# Patient Record
Sex: FEMALE | Race: BLACK OR AFRICAN AMERICAN | NOT HISPANIC OR LATINO
[De-identification: names, ages, dates, MRNs, and addresses within clinical notes are randomized per-mention and may not be internally consistent; named-entity substitution may affect disease eponyms.]

---

## 2017-09-19 ENCOUNTER — APPOINTMENT (OUTPATIENT)
Dept: OTOLARYNGOLOGY | Facility: CLINIC | Age: 42
End: 2017-09-19
Payer: COMMERCIAL

## 2017-09-19 VITALS — OXYGEN SATURATION: 100 % | DIASTOLIC BLOOD PRESSURE: 78 MMHG | SYSTOLIC BLOOD PRESSURE: 122 MMHG | HEART RATE: 61 BPM

## 2017-09-19 PROCEDURE — 31575 DIAGNOSTIC LARYNGOSCOPY: CPT

## 2017-09-19 PROCEDURE — 99214 OFFICE O/P EST MOD 30 MIN: CPT | Mod: 25

## 2017-09-19 RX ORDER — OMEPRAZOLE 40 MG/1
40 CAPSULE, DELAYED RELEASE ORAL TWICE DAILY
Qty: 120 | Refills: 2 | Status: ACTIVE | COMMUNITY
Start: 2017-09-19 | End: 1900-01-01

## 2017-09-22 LAB
25(OH)D3 SERPL-MCNC: 15.1 NG/ML
ALBUMIN SERPL ELPH-MCNC: 4.4 G/DL
ALP BLD-CCNC: 67 U/L
ALT SERPL-CCNC: 17 U/L
ANION GAP SERPL CALC-SCNC: 13 MMOL/L
AST SERPL-CCNC: 19 U/L
BASOPHILS # BLD AUTO: 0.01 K/UL
BASOPHILS NFR BLD AUTO: 0.2 %
BILIRUB SERPL-MCNC: 0.2 MG/DL
BUN SERPL-MCNC: 10 MG/DL
CALCIUM SERPL-MCNC: 10 MG/DL
CALCIUM SERPL-MCNC: 10 MG/DL
CHLORIDE SERPL-SCNC: 104 MMOL/L
CO2 SERPL-SCNC: 24 MMOL/L
CREAT SERPL-MCNC: 1.03 MG/DL
EOSINOPHIL # BLD AUTO: 0.19 K/UL
EOSINOPHIL NFR BLD AUTO: 3.7 %
GLUCOSE SERPL-MCNC: 77 MG/DL
HCT VFR BLD CALC: 37.7 %
HGB BLD-MCNC: 12.3 G/DL
IMM GRANULOCYTES NFR BLD AUTO: 0.2 %
LYMPHOCYTES # BLD AUTO: 1.89 K/UL
LYMPHOCYTES NFR BLD AUTO: 36.5 %
MAN DIFF?: NORMAL
MCHC RBC-ENTMCNC: 27.6 PG
MCHC RBC-ENTMCNC: 32.6 GM/DL
MCV RBC AUTO: 84.7 FL
MONOCYTES # BLD AUTO: 0.46 K/UL
MONOCYTES NFR BLD AUTO: 8.9 %
NEUTROPHILS # BLD AUTO: 2.62 K/UL
NEUTROPHILS NFR BLD AUTO: 50.5 %
PARATHYROID HORMONE INTACT: 27 PG/ML
PLATELET # BLD AUTO: 282 K/UL
POTASSIUM SERPL-SCNC: 4.9 MMOL/L
PROT SERPL-MCNC: 7.5 G/DL
RBC # BLD: 4.45 M/UL
RBC # FLD: 13.3 %
SODIUM SERPL-SCNC: 141 MMOL/L
T3FREE SERPL-MCNC: 2.61 PG/ML
T4 FREE SERPL-MCNC: 1.3 NG/DL
THYROGLOB AB SERPL-ACNC: 1481 IU/ML
THYROPEROXIDASE AB SERPL IA-ACNC: <10 IU/ML
TSH SERPL-ACNC: 2.69 UIU/ML
WBC # FLD AUTO: 5.18 K/UL

## 2017-09-24 ENCOUNTER — FORM ENCOUNTER (OUTPATIENT)
Age: 42
End: 2017-09-24

## 2017-09-25 ENCOUNTER — OUTPATIENT (OUTPATIENT)
Dept: OUTPATIENT SERVICES | Facility: HOSPITAL | Age: 42
LOS: 1 days | End: 2017-09-25
Payer: COMMERCIAL

## 2017-09-25 PROCEDURE — 76536 US EXAM OF HEAD AND NECK: CPT

## 2017-09-25 PROCEDURE — 76536 US EXAM OF HEAD AND NECK: CPT | Mod: 26

## 2017-10-02 ENCOUNTER — APPOINTMENT (OUTPATIENT)
Dept: OTOLARYNGOLOGY | Facility: CLINIC | Age: 42
End: 2017-10-02
Payer: COMMERCIAL

## 2017-10-02 VITALS — OXYGEN SATURATION: 100 % | HEART RATE: 73 BPM | DIASTOLIC BLOOD PRESSURE: 89 MMHG | SYSTOLIC BLOOD PRESSURE: 129 MMHG

## 2017-10-02 PROCEDURE — 76942 ECHO GUIDE FOR BIOPSY: CPT

## 2017-10-02 PROCEDURE — 10022: CPT

## 2017-10-02 PROCEDURE — 99214 OFFICE O/P EST MOD 30 MIN: CPT | Mod: 25

## 2017-10-16 ENCOUNTER — APPOINTMENT (OUTPATIENT)
Dept: ENDOCRINOLOGY | Facility: CLINIC | Age: 42
End: 2017-10-16
Payer: COMMERCIAL

## 2017-10-16 VITALS
BODY MASS INDEX: 33.95 KG/M2 | DIASTOLIC BLOOD PRESSURE: 80 MMHG | HEIGHT: 68 IN | WEIGHT: 224 LBS | SYSTOLIC BLOOD PRESSURE: 124 MMHG | HEART RATE: 64 BPM

## 2017-10-16 DIAGNOSIS — E66.3 OVERWEIGHT: ICD-10-CM

## 2017-10-16 PROCEDURE — 99204 OFFICE O/P NEW MOD 45 MIN: CPT

## 2017-11-02 ENCOUNTER — APPOINTMENT (OUTPATIENT)
Dept: OTOLARYNGOLOGY | Facility: CLINIC | Age: 42
End: 2017-11-02

## 2018-10-15 ENCOUNTER — APPOINTMENT (OUTPATIENT)
Dept: ENDOCRINOLOGY | Facility: CLINIC | Age: 43
End: 2018-10-15

## 2021-11-09 ENCOUNTER — APPOINTMENT (OUTPATIENT)
Dept: OTOLARYNGOLOGY | Facility: CLINIC | Age: 46
End: 2021-11-09
Payer: COMMERCIAL

## 2021-11-09 VITALS
RESPIRATION RATE: 14 BRPM | DIASTOLIC BLOOD PRESSURE: 80 MMHG | HEART RATE: 78 BPM | BODY MASS INDEX: 33.34 KG/M2 | HEIGHT: 68 IN | WEIGHT: 220 LBS | SYSTOLIC BLOOD PRESSURE: 125 MMHG | TEMPERATURE: 98.2 F | OXYGEN SATURATION: 100 %

## 2021-11-09 DIAGNOSIS — R68.89 OTHER GENERAL SYMPTOMS AND SIGNS: ICD-10-CM

## 2021-11-09 DIAGNOSIS — Z86.39 PERSONAL HISTORY OF OTHER ENDOCRINE, NUTRITIONAL AND METABOLIC DISEASE: ICD-10-CM

## 2021-11-09 DIAGNOSIS — Z87.19 PERSONAL HISTORY OF OTHER DISEASES OF THE DIGESTIVE SYSTEM: ICD-10-CM

## 2021-11-09 DIAGNOSIS — E04.9 NONTOXIC GOITER, UNSPECIFIED: ICD-10-CM

## 2021-11-09 DIAGNOSIS — R13.10 DYSPHAGIA, UNSPECIFIED: ICD-10-CM

## 2021-11-09 PROCEDURE — 31575 DIAGNOSTIC LARYNGOSCOPY: CPT

## 2021-11-09 PROCEDURE — 99205 OFFICE O/P NEW HI 60 MIN: CPT | Mod: 25

## 2021-11-09 PROCEDURE — 76536 US EXAM OF HEAD AND NECK: CPT

## 2021-11-09 RX ORDER — LEVOTHYROXINE SODIUM 0.15 MG/1
150 TABLET ORAL DAILY
Qty: 90 | Refills: 3 | Status: ACTIVE | COMMUNITY
Start: 2021-11-09 | End: 1900-01-01

## 2021-11-09 RX ORDER — MULTIVITAMIN
TABLET ORAL
Refills: 0 | Status: ACTIVE | COMMUNITY

## 2021-11-09 RX ORDER — IRON/IRON ASP GLY/FA/MV-MIN 38 125-25-1MG
TABLET ORAL
Refills: 0 | Status: ACTIVE | COMMUNITY

## 2021-11-09 NOTE — HISTORY OF PRESENT ILLNESS
[de-identified] : Sukhi is a 39 y/o female who had noticed thyroid enlargement and tenderness about 1 year ago and told that her thyroid gland was enlarged on an ultrasound and was treated with thyroid hormone x 1 month with improvement. Apparently she had abnormal thyroid function studies at that time consistent with hypothyroidism which had resolved after a month.  She remained off of any thyroid hormone replacement until she noticed that her thyroid gland again had enlarged one month ago and she was feeling a sensation of choking and mild dysphagia or tightness in her neck.  She then had a complete physical examination by Dr. Martin and again thyroid function studies were obtained revealing and elevated TSH at 4.59 with a normal free T4 at 1.3 and markedly elevated thyroglobulin antibodies but normal thyroid peroxidase antibodies.  She was again started back on thyroid hormone replacement at 50 mcg daily.  Her symptoms have now abated on the hormone replacement.  Currently she denies changes in her voice, shortness of breath, dysphagia, neck pain or pressure.  She has not yet had another ultrasound of her neck since the last one was obtained.  She has no family history of thyroid disease.  There is no history of radiation exposure to her neck.  Her weight has been generally stable.\par  [FreeTextEntry1] : Sukhi returns today for f/u regarding a NTMNG. A dominant nodule in the right upper lobe was biopsied in 2017 and benign with evidence for lymphocytic thyroiditis. Her last set of TFTs revealed subclinical hypothyroidism with a TSH elevated to 9.0 on Levothyroxine 125 mcgs daily.  She stated that she continues to feel moderate constant neck pressure and mild dysphagia (increasing effort to swallow unless chewing well, but nothing caught in the throat), not associated with weight gain and increasing fatigue.  She admits to more throat clearing lately. She is taking omeprazole for three years for LPR. She had a break from it recently and developed severe nausea. She had an EGD in 2017 and told she had "silent reflux". She denies any recent voice changes other than a lower pitch or shortness of breath. She denies fever, body aches, cough, cyanosis, chest burning, anosmia or recent known COVID exposures.  All family members at home are well. She is vaccinated.  She has not had any neck imaging since 2017. Her last thyroid ultrasound in 2017 revealed a normal-sized thyroid gland with bilateral nodules the larger in the right upper lobe.

## 2021-11-09 NOTE — PROCEDURE
[Image(s) Captured] : image(s) captured and filed [Unable to Cooperate with Mirror] : patient unable to cooperate with mirror [Gag Reflex] : gag reflex preventing mirror examination [Globus] : globus [Topical Lidocaine] : topical lidocaine [Flexible Endoscope] : examined with the flexible endoscope [Normal] : normal base of the tongue [True Vocal Cords Paralysis] : no true vocal cord paralysis [Hoarseness] : hoarseness not clearly evaluated by indirect laryngoscopy [Oxymetazoline HCl] : oxymetazoline HCl [Serial Number: ___] : Serial Number: [unfilled] [FreeTextEntry3] : \par [facility]\par THYROID/NECK ULTRASOUND REPORT\par \par NAME: JUAN LUTHER .....           MR# [id number]].....	              : 1975.....	         DATE: 2021.\par \par HISTORY/ INDICATIONS: A 46-year-old female with history of autoimmune thyroiditis and mild dysphagia.  \par \par COMPARISON: Outside study dated 2017.\par \par PROCEDURE: Physician performed high-resolution ultrasound gray scale imaging and color Doppler supplementation of the thyroid gland and neck was obtained in the longitudinal and transverse planes using a 13 MHz linear transducer with image capture.  All measurements are in centimeters (longitudinal x AP x transverse).  \par \par FINDINGS: Overall the thyroid gland is slightly enlarged in AP dimension, markedly heterogeneous in echotexture with increased vascularity on color Doppler flow and with numerous linear hyperechoic lines suggesting fibrosis.\par \par RIGHT LOBE: Is enlarged in the AP dimension, markedly heterogeneous, with increased vascularity on color Doppler and measures 4.29 x 2.54 x 2.01 cm.  There are numerous echogenic septations throughout the gland and no discrete well-defined nodules. \par \par ISTHMUS: Measures 0.41 cm in AP dimension and is heterogeneous in echotexture with normal vascularity.  No nodules are identified.\par \par LEFT LOBE: Is enlarged in the AP dimension, markedly heterogeneous, with increased vascularity on color Doppler and measures 4.40 x 2.87 x 1.71 cm.  There are numerous echogenic septations throughout the gland and no discrete well-defined nodules. \par \par PARATHYROID GLANDS: There are no identified enlarged parathyroid glands in the central neck compartment. \par \par LYMPH NODES: Bilateral neck levels I - VI were examined.  There are several benign appearing subcentimeter lymph nodes identified at neck levels II- III bilaterally (lateral neck), all with echogenic hilar lines, no calcifications or cystic degeneration and have a short long axis ratio < 0.5 in the transverse plane.  There are no enlarged or abnormal appearing central compartment, level VI lymph nodes.\par \par IMPRESSION: A 46-year-old female with a slightly enlarged (AP) hypoechoic and markedly heterogeneous consistent with Hashimoto's thyroiditis.  A previously identified 1.3 cm right upper lobe nodule is no longer easily identified.\par \par RECOMMENDATIONS: Repeat thyroid ultrasound in 1 year as well as continued monitoring of TSH. \par \par Electronically signed by Ken Cole MD on 2021, 2:35 PM\par \par [facility]: [address] [place] [address], [address] [place] [address]\par [phone] (voice), [phone] (fax) [de-identified] : The nasal septum is minimally deviated to the left with a right anterior spur. There are no masses or polyps and the nasal mucosa and secretions are normal. The choanae and posterior nasopharynx are normal without masses or drainage. The Eustachian tube orifices appear patent. The pharynx, including the posterior and lateral pharyngeal walls, the vallecula and base of tongue are normal without ulcerations, lesions or masses. The hypopharynx including the pyriform sinuses open well without pooling of secretions, mucosal lesions or masses. The supraglottic larynx including the epiglottis, petiole, arytenoids, glossoepiglottic, aryepiglottic and pharyngoepiglottic folds are normal without mucosal lesions, ulcerations or masses. The glottis reveals normal false vocal folds. The true vocal folds are glistening white, tense and of equal length, without paralysis, having symmetric mobility on adduction and abduction. There are no mucosal lesions, nodules, cysts, erythroplasia or leukoplakia. The posterior cricoid area has healthy pink mucosa in the interarytenoid area and esophageal inlet. There is moderate-severe thickening and tiger striping of the interarytenoid mucosa suggestive of posterior laryngitis from laryngopharyngeal acid reflux disease. The trachea is clear without narrowing in the immediate subglottic region, without deviation or lesions.  [de-identified] : thyroid nodules

## 2021-11-09 NOTE — CONSULT LETTER
[Dear  ___] : Dear  [unfilled], [Consult Letter:] : I had the pleasure of evaluating your patient, [unfilled]. [Please see my note below.] : Please see my note below. [Consult Closing:] : Thank you very much for allowing me to participate in the care of this patient.  If you have any questions, please do not hesitate to contact me. [Sincerely,] : Sincerely, [Ken Cole M.D., FACS, [place]] : Ken Cole M.D., FACS, [facility] [Director, [facility]] : Director, [facility] [[facility]] : [facility] [[facility]] : [facility]  [Certified in Neck Ultrasound/ ECNU & AIUM] : Certified in Neck Ultrasound/ ECNU & AIUM [] :  [Otolaryngology/Head & Neck Surgery] : Otolaryngology/Head & Neck Surgery [[facility]] : [facility]

## 2021-11-09 NOTE — DATA REVIEWED
[de-identified] : see HPI [de-identified] : see HPI [de-identified] : Cytology report reviewed

## 2021-11-09 NOTE — REASON FOR VISIT
[FreeTextEntry2] : consultation for thyroid enlargement Hashimoto's thyroiditis and hypothyroidism. [FreeTextEntry1] : Referred by Huan Martin MD PCP, Endocrnologist is Eduard Dupree MD

## 2021-11-11 RX ORDER — CEPHALEXIN 500 MG/1
500 CAPSULE ORAL
Qty: 14 | Refills: 0 | Status: ACTIVE | COMMUNITY
Start: 2021-06-09

## 2021-11-11 RX ORDER — AMOXICILLIN AND CLAVULANATE POTASSIUM 875; 125 MG/1; MG/1
875-125 TABLET, COATED ORAL
Qty: 10 | Refills: 0 | Status: ACTIVE | COMMUNITY
Start: 2021-08-27

## 2021-11-11 RX ORDER — LIDOCAINE 50 MG/G
5 CREAM TOPICAL
Qty: 60 | Refills: 0 | Status: ACTIVE | COMMUNITY
Start: 2021-06-09

## 2021-11-11 RX ORDER — IBUPROFEN 800 MG/1
800 TABLET, FILM COATED ORAL
Qty: 15 | Refills: 0 | Status: ACTIVE | COMMUNITY
Start: 2021-06-09

## 2021-12-06 ENCOUNTER — APPOINTMENT (OUTPATIENT)
Dept: OTOLARYNGOLOGY | Facility: CLINIC | Age: 46
End: 2021-12-06

## 2022-07-28 ENCOUNTER — APPOINTMENT (OUTPATIENT)
Dept: OTOLARYNGOLOGY | Facility: CLINIC | Age: 47
End: 2022-07-28

## 2022-07-28 VITALS
DIASTOLIC BLOOD PRESSURE: 73 MMHG | HEIGHT: 68 IN | OXYGEN SATURATION: 97 % | HEART RATE: 72 BPM | RESPIRATION RATE: 16 BRPM | SYSTOLIC BLOOD PRESSURE: 123 MMHG | TEMPERATURE: 98 F

## 2022-07-28 DIAGNOSIS — E03.9 HYPOTHYROIDISM, UNSPECIFIED: ICD-10-CM

## 2022-07-28 DIAGNOSIS — E55.9 VITAMIN D DEFICIENCY, UNSPECIFIED: ICD-10-CM

## 2022-07-28 DIAGNOSIS — E04.2 NONTOXIC MULTINODULAR GOITER: ICD-10-CM

## 2022-07-28 DIAGNOSIS — R13.13 DYSPHAGIA, PHARYNGEAL PHASE: ICD-10-CM

## 2022-07-28 DIAGNOSIS — E06.3 AUTOIMMUNE THYROIDITIS: ICD-10-CM

## 2022-07-28 DIAGNOSIS — D44.0 NEOPLASM OF UNCERTAIN BEHAVIOR OF THYROID GLAND: ICD-10-CM

## 2022-07-28 DIAGNOSIS — K21.9 GASTRO-ESOPHAGEAL REFLUX DISEASE W/OUT ESOPHAGITIS: ICD-10-CM

## 2022-07-28 PROCEDURE — 31575 DIAGNOSTIC LARYNGOSCOPY: CPT

## 2022-07-28 PROCEDURE — 99215 OFFICE O/P EST HI 40 MIN: CPT | Mod: 25

## 2022-07-28 NOTE — CONSULT LETTER
[Dear  ___] : Dear  [unfilled], [Consult Letter:] : I had the pleasure of evaluating your patient, [unfilled]. [Please see my note below.] : Please see my note below. [Consult Closing:] : Thank you very much for allowing me to participate in the care of this patient.  If you have any questions, please do not hesitate to contact me. [Sincerely,] : Sincerely, [Ken Cole M.D., FACS, [place]] : Ken Cole M.D., FACS, [facility] [Director, [facility]] : Director, [facility] [[facility]] : [facility] [[facility]] : [facility]  [Certified in Neck Ultrasound/ ECNU & AIUM] : Certified in Neck Ultrasound/ ECNU & AIUM [] :  [Otolaryngology/Head & Neck Surgery] : Otolaryngology/Head & Neck Surgery [[facility]] : [facility]

## 2022-07-28 NOTE — PROCEDURE
[Image(s) Captured] : image(s) captured and filed [Unable to Cooperate with Mirror] : patient unable to cooperate with mirror [Gag Reflex] : gag reflex preventing mirror examination [Hoarseness] : hoarseness not clearly evaluated by indirect laryngoscopy [Globus] : globus [Topical Lidocaine] : topical lidocaine [Oxymetazoline HCl] : oxymetazoline HCl [Flexible Endoscope] : examined with the flexible endoscope [Serial Number: ___] : Serial Number: [unfilled] [Normal] : normal base of the tongue [True Vocal Cords Paralysis] : no true vocal cord paralysis [de-identified] : The nasal septum is minimally deviated to the left with a right anterior spur. There are no masses or polyps and the nasal mucosa and secretions are normal. The choanae and posterior nasopharynx are normal without masses or drainage. The Eustachian tube orifices appear patent. The pharynx, including the posterior and lateral pharyngeal walls, the vallecula and base of tongue are normal without ulcerations, lesions or masses. The hypopharynx including the pyriform sinuses open well without pooling of secretions, mucosal lesions or masses. The supraglottic larynx including the epiglottis, petiole, arytenoids, glossoepiglottic, aryepiglottic and pharyngoepiglottic folds are normal without mucosal lesions, ulcerations or masses. The glottis reveals normal false vocal folds. The true vocal folds are glistening white, tense and of equal length, without paralysis, having symmetric mobility on adduction and abduction. There are no mucosal lesions, nodules, cysts, erythroplasia or leukoplakia. The posterior cricoid area has healthy pink mucosa in the interarytenoid area and esophageal inlet. There is persistent moderate thickening and tiger striping of the interarytenoid mucosa suggestive of posterior laryngitis from laryngopharyngeal acid reflux disease. The trachea is clear without narrowing in the immediate subglottic region, without deviation or lesions.  [de-identified] : thyroid nodules, LPR, globus

## 2022-07-28 NOTE — REASON FOR VISIT
[FreeTextEntry1] : Referred by Huan Martin MD PCP, Endocrinologist is Eduard Dupree MD [FreeTextEntry2] : f/u consultation for thyroid enlargement Hashimoto's thyroiditis, hypothyroidism and LPR

## 2022-07-28 NOTE — HISTORY OF PRESENT ILLNESS
----- Message from Kena Ojeda sent at 4/8/2020  8:12 AM CDT -----  Contact: patient  Patient would like to know if she was scheduled for an IED check up for today? Patient's chart wasn't showing any upcoming appointments. Please call back at [phone]   [de-identified] : Sukih is a 37 y/o female who had noticed thyroid enlargement and tenderness about 1 year ago and told that her thyroid gland was enlarged on an ultrasound and was treated with thyroid hormone x 1 month with improvement. Apparently she had abnormal thyroid function studies at that time consistent with hypothyroidism which had resolved after a month.  She remained off of any thyroid hormone replacement until she noticed that her thyroid gland again had enlarged one month ago and she was feeling a sensation of choking and mild dysphagia or tightness in her neck.  She then had a complete physical examination by Dr. Martin and again thyroid function studies were obtained revealing and elevated TSH at 4.59 with a normal free T4 at 1.3 and markedly elevated thyroglobulin antibodies but normal thyroid peroxidase antibodies.  She was again started back on thyroid hormone replacement at 50 mcg daily.  Her symptoms have now abated on the hormone replacement.  Currently she denies changes in her voice, shortness of breath, dysphagia, neck pain or pressure.  She has not yet had another ultrasound of her neck since the last one was obtained.  She has no family history of thyroid disease.  There is no history of radiation exposure to her neck.  Her weight has been generally stable.\par  [FreeTextEntry1] : Sukhi returns today for f/u regarding a NTMNG.  A dominant nodule in the right upper lobe was biopsied in 2017 and benign with evidence for lymphocytic thyroiditis. Her last set of TFTs again revealed subclinical hypothyroidism with a TSH elevated to 7.57 on Levothyroxine 150 mcgs daily.  Her free T4 is 1.2 and a low vitamin D 25-OH total. She stated that she continues to feel moderate constant neck pressure and mild dysphagia (increasing effort to swallow unless chewing well, but nothing caught in the throat), not associated with weight gain or fatigue.  She admits to  less throat clearing lately. She is taking Pepcid AC for LPR. She had an EGD in 2017 and told she had "silent reflux". She denies any recent voice changes other than a lower pitch or shortness of breath. She denies fever, body aches, cough, cyanosis, chest burning, anosmia or recent known COVID exposures.  All family members at home are well. She is vaccinated and boosted x 1.  She has not had any neck imaging since 2017. Her last thyroid ultrasound in 2017 revealed a normal-size thyroid gland with bilateral nodules the larger in the right upper lobe.  However, her last US in the office did not show any discrete thyroid nodules but a very heterogeneous abnormal appearing thyroid gland consistent with autoimmune thyroiditis. She has IBS lately and has not had a colonoscopy.  She is to discuss this with her PCP on Monday.

## 2022-07-28 NOTE — DATA REVIEWED
[de-identified] : see HPI [de-identified] : see HPI [de-identified] : Cytology report reviewed

## 2022-09-29 ENCOUNTER — APPOINTMENT (OUTPATIENT)
Dept: OTOLARYNGOLOGY | Facility: CLINIC | Age: 47
End: 2022-09-29

## 2022-10-19 ENCOUNTER — RX RENEWAL (OUTPATIENT)
Age: 47
End: 2022-10-19

## 2023-10-20 ENCOUNTER — RX RENEWAL (OUTPATIENT)
Age: 48
End: 2023-10-20

## 2023-10-20 RX ORDER — LEVOTHYROXINE SODIUM 0.15 MG/1
150 TABLET ORAL DAILY
Qty: 90 | Refills: 3 | Status: ACTIVE | COMMUNITY
Start: 2021-11-11 | End: 1900-01-01

## 2024-10-08 ENCOUNTER — RX RENEWAL (OUTPATIENT)
Age: 49
End: 2024-10-08

## 2024-12-05 ENCOUNTER — NON-APPOINTMENT (OUTPATIENT)
Age: 49
End: 2024-12-05

## 2024-12-12 ENCOUNTER — APPOINTMENT (OUTPATIENT)
Dept: OTOLARYNGOLOGY | Facility: CLINIC | Age: 49
End: 2024-12-12
Payer: COMMERCIAL

## 2024-12-12 VITALS
WEIGHT: 220 LBS | DIASTOLIC BLOOD PRESSURE: 84 MMHG | SYSTOLIC BLOOD PRESSURE: 129 MMHG | OXYGEN SATURATION: 98 % | HEART RATE: 86 BPM | BODY MASS INDEX: 33.34 KG/M2 | HEIGHT: 68 IN

## 2024-12-12 DIAGNOSIS — E04.2 NONTOXIC MULTINODULAR GOITER: ICD-10-CM

## 2024-12-12 DIAGNOSIS — R13.10 DYSPHAGIA, UNSPECIFIED: ICD-10-CM

## 2024-12-12 DIAGNOSIS — E04.9 NONTOXIC GOITER, UNSPECIFIED: ICD-10-CM

## 2024-12-12 DIAGNOSIS — E06.3 AUTOIMMUNE THYROIDITIS: ICD-10-CM

## 2024-12-12 DIAGNOSIS — R13.13 DYSPHAGIA, PHARYNGEAL PHASE: ICD-10-CM

## 2024-12-12 DIAGNOSIS — G47.9 SLEEP DISORDER, UNSPECIFIED: ICD-10-CM

## 2024-12-12 DIAGNOSIS — D44.0 NEOPLASM OF UNCERTAIN BEHAVIOR OF THYROID GLAND: ICD-10-CM

## 2024-12-12 DIAGNOSIS — E03.9 HYPOTHYROIDISM, UNSPECIFIED: ICD-10-CM

## 2024-12-12 DIAGNOSIS — R09.89 OTHER SPECIFIED SYMPTOMS AND SIGNS INVOLVING THE CIRCULATORY AND RESPIRATORY SYSTEMS: ICD-10-CM

## 2024-12-12 PROCEDURE — 10005 FNA BX W/US GDN 1ST LES: CPT

## 2024-12-12 PROCEDURE — 99215 OFFICE O/P EST HI 40 MIN: CPT | Mod: 25

## 2024-12-12 PROCEDURE — 31575 DIAGNOSTIC LARYNGOSCOPY: CPT

## 2025-01-03 ENCOUNTER — APPOINTMENT (OUTPATIENT)
Dept: SLEEP CENTER | Facility: HOME HEALTH | Age: 50
End: 2025-01-03

## 2025-01-09 ENCOUNTER — APPOINTMENT (OUTPATIENT)
Dept: OTOLARYNGOLOGY | Facility: CLINIC | Age: 50
End: 2025-01-09

## 2025-06-12 ENCOUNTER — APPOINTMENT (OUTPATIENT)
Dept: ENDOCRINOLOGY | Facility: CLINIC | Age: 50
End: 2025-06-12